# Patient Record
(demographics unavailable — no encounter records)

---

## 2025-04-02 NOTE — SIGNATURES
[TextEntry] : This note was written by Emeli Luis on 03/25/2025 actively solely JEREMIE Gleason M.D 03/25/2025. All medical record entries made by this scribe were at my  JEREMIE Gleason M.D  direction and personally dictated by me on 03/25/2025. I have personally reviewed my chart and agree that the record reflects my personal performance of the history, physical exam, assessment, and plan.

## 2025-04-02 NOTE — HISTORY OF PRESENT ILLNESS
[FreeTextEntry1] : 62yo  presents for follow up visit for vulvar itching. Onset 3 wks ago. Pt reports starting a new antibiotic around 3 wks ago. Denies intermenstrual bleeding.  Pt was diagnosed with lymphoma 2 years ago, s/ treatment, [PapSmeardate] : 05/23

## 2025-04-02 NOTE — PHYSICAL EXAM
[Chaperone Present] : A chaperone was present in the examining room during all aspects of the physical examination [Labia Majora] : normal [Labia Minora] : normal [Erythematous] : erythema [Discharge] : discharge [Normal] : normal [Uterine Adnexae] : normal [FreeTextEntry2] : Sofia [Soft] : soft [Non-tender] : non-tender [Scant] : scant [Foul Smelling] : not foul smelling [White] : white [Thick] : thick

## 2025-04-02 NOTE — PLAN
[FreeTextEntry1] : Acute vulvitis Rx for Fluconazole sent for empiric treatment for VVC Vaginitis panel done today  Pap smear to confirm normal smear following abnormal smear HPV/Pap done today